# Patient Record
Sex: FEMALE | Race: BLACK OR AFRICAN AMERICAN | NOT HISPANIC OR LATINO | URBAN - NONMETROPOLITAN AREA
[De-identification: names, ages, dates, MRNs, and addresses within clinical notes are randomized per-mention and may not be internally consistent; named-entity substitution may affect disease eponyms.]

---

## 2019-04-27 ENCOUNTER — IMPORTED ENCOUNTER (OUTPATIENT)
Dept: URBAN - NONMETROPOLITAN AREA CLINIC 1 | Facility: CLINIC | Age: 64
End: 2019-04-27

## 2019-04-27 PROBLEM — H25.13: Noted: 2019-04-27

## 2019-04-27 PROBLEM — H52.4: Noted: 2019-04-27

## 2019-04-27 PROCEDURE — S0620 ROUTINE OPHTHALMOLOGICAL EXA: HCPCS

## 2019-04-27 NOTE — PATIENT DISCUSSION
Presbyopia Discussed refractive status with patient. New glasses Rx given today. Deanne Discussed diagnosis with patient. Reviewed symptoms related to cataract progression. Discussed various treatment options with patient. Continue to monitor.

## 2021-04-12 NOTE — PATIENT DISCUSSION
Advised Pt okay to stop antibiotic drops and to discard numbing drops and not to use them as can be dangerous if overused.

## 2022-04-10 ASSESSMENT — TONOMETRY
OD_IOP_MMHG: 12
OS_IOP_MMHG: 12

## 2022-04-10 ASSESSMENT — VISUAL ACUITY
OD_SC: 20/20-
OS_SC: 20/20